# Patient Record
Sex: MALE | Race: AMERICAN INDIAN OR ALASKA NATIVE
[De-identification: names, ages, dates, MRNs, and addresses within clinical notes are randomized per-mention and may not be internally consistent; named-entity substitution may affect disease eponyms.]

---

## 2018-09-15 ENCOUNTER — HOSPITAL ENCOUNTER (EMERGENCY)
Dept: HOSPITAL 94 - ER | Age: 43
LOS: 1 days | Discharge: HOME | End: 2018-09-16
Payer: MEDICAID

## 2018-09-15 ENCOUNTER — HOSPITAL ENCOUNTER (EMERGENCY)
Dept: HOSPITAL 94 - ER | Age: 43
Discharge: HOME | End: 2018-09-15
Payer: MEDICAID

## 2018-09-15 VITALS — SYSTOLIC BLOOD PRESSURE: 193 MMHG | DIASTOLIC BLOOD PRESSURE: 117 MMHG

## 2018-09-15 VITALS — HEIGHT: 73 IN | BODY MASS INDEX: 33.2 KG/M2 | WEIGHT: 250.53 LBS

## 2018-09-15 VITALS — WEIGHT: 242.51 LBS | HEIGHT: 73 IN | BODY MASS INDEX: 32.14 KG/M2

## 2018-09-15 DIAGNOSIS — I10: ICD-10-CM

## 2018-09-15 DIAGNOSIS — F12.90: ICD-10-CM

## 2018-09-15 DIAGNOSIS — Z79.899: ICD-10-CM

## 2018-09-15 DIAGNOSIS — F15.90: ICD-10-CM

## 2018-09-15 DIAGNOSIS — Z88.6: ICD-10-CM

## 2018-09-15 DIAGNOSIS — M10.9: ICD-10-CM

## 2018-09-15 DIAGNOSIS — F19.90: ICD-10-CM

## 2018-09-15 DIAGNOSIS — M10.9: Primary | ICD-10-CM

## 2018-09-15 DIAGNOSIS — R00.2: Primary | ICD-10-CM

## 2018-09-15 DIAGNOSIS — R50.9: ICD-10-CM

## 2018-09-15 LAB
ALBUMIN SERPL BCP-MCNC: 3.1 G/DL (ref 3.4–5)
ALBUMIN/GLOB SERPL: 0.8 {RATIO} (ref 1.1–1.5)
ALP SERPL-CCNC: 97 IU/L (ref 46–116)
ALT SERPL W P-5'-P-CCNC: 113 U/L (ref 12–78)
ANION GAP SERPL CALCULATED.3IONS-SCNC: 8 MMOL/L (ref 8–16)
APTT PPP: 27 SECONDS (ref 22–32)
AST SERPL W P-5'-P-CCNC: 61 U/L (ref 10–37)
BASOPHILS # BLD AUTO: 0 X10'3 (ref 0–0.2)
BASOPHILS NFR BLD AUTO: 0.3 % (ref 0–1)
BILIRUB SERPL-MCNC: 0.8 MG/DL (ref 0.1–1)
BUN SERPL-MCNC: 14 MG/DL (ref 7–18)
BUN/CREAT SERPL: 11.2 (ref 5.4–32)
CALCIUM SERPL-MCNC: 8.4 MG/DL (ref 8.5–10.1)
CHLORIDE SERPL-SCNC: 99 MMOL/L (ref 99–107)
CO2 SERPL-SCNC: 27.8 MMOL/L (ref 24–32)
CREAT SERPL-MCNC: 1.25 MG/DL (ref 0.6–1.1)
EOSINOPHIL # BLD AUTO: 0.2 X10'3 (ref 0–0.9)
EOSINOPHIL NFR BLD AUTO: 1.8 % (ref 0–6)
ERYTHROCYTE [DISTWIDTH] IN BLOOD BY AUTOMATED COUNT: 13.8 % (ref 11.5–14.5)
GFR SERPL CREATININE-BSD FRML MDRD: 63 ML/MIN
GLUCOSE SERPL-MCNC: 125 MG/DL (ref 70–104)
HCT VFR BLD AUTO: 39.8 % (ref 42–52)
HGB BLD-MCNC: 13.8 G/DL (ref 14–17.9)
INR PPP: 0.9 INR
LYMPHOCYTES # BLD AUTO: 0.9 X10'3 (ref 1.1–4.8)
LYMPHOCYTES NFR BLD AUTO: 8.2 % (ref 21–51)
MAGNESIUM SERPL-MCNC: 2.2 MG/DL (ref 1.5–2.4)
MCH RBC QN AUTO: 30.8 PG (ref 27–31)
MCHC RBC AUTO-ENTMCNC: 34.7 % (ref 33–36.5)
MCV RBC AUTO: 88.8 FL (ref 78–98)
MONOCYTES # BLD AUTO: 0.9 X10'3 (ref 0–0.9)
MONOCYTES NFR BLD AUTO: 8.3 % (ref 2–12)
NEUTROPHILS # BLD AUTO: 8.5 X10'3 (ref 1.8–7.7)
NEUTROPHILS NFR BLD AUTO: 81.4 % (ref 42–75)
PLATELET # BLD AUTO: 189 X10'3 (ref 140–440)
PMV BLD AUTO: 7.9 FL (ref 7.4–10.4)
POTASSIUM SERPL-SCNC: 3.4 MMOL/L (ref 3.5–5.1)
PROT SERPL-MCNC: 7.2 G/DL (ref 6.4–8.2)
PROTHROMBIN TIME: 9.5 SECONDS (ref 9–12)
RBC # BLD AUTO: 4.48 X10'6 (ref 4.7–6.1)
SODIUM SERPL-SCNC: 135 MMOL/L (ref 135–145)
TROPONIN I SERPL-MCNC: < 0.04 NG/ML (ref 0–0.05)
WBC # BLD AUTO: 10.4 X10'3 (ref 4.5–11)

## 2018-09-15 PROCEDURE — 96375 TX/PRO/DX INJ NEW DRUG ADDON: CPT

## 2018-09-15 PROCEDURE — 96365 THER/PROPH/DIAG IV INF INIT: CPT

## 2018-09-15 PROCEDURE — 87040 BLOOD CULTURE FOR BACTERIA: CPT

## 2018-09-15 PROCEDURE — 85025 COMPLETE CBC W/AUTO DIFF WBC: CPT

## 2018-09-15 PROCEDURE — 83605 ASSAY OF LACTIC ACID: CPT

## 2018-09-15 PROCEDURE — 96376 TX/PRO/DX INJ SAME DRUG ADON: CPT

## 2018-09-15 PROCEDURE — 93005 ELECTROCARDIOGRAM TRACING: CPT

## 2018-09-15 PROCEDURE — 84145 PROCALCITONIN (PCT): CPT

## 2018-09-15 PROCEDURE — 71045 X-RAY EXAM CHEST 1 VIEW: CPT

## 2018-09-15 PROCEDURE — 99283 EMERGENCY DEPT VISIT LOW MDM: CPT

## 2018-09-15 PROCEDURE — 36415 COLL VENOUS BLD VENIPUNCTURE: CPT

## 2018-09-15 PROCEDURE — 99285 EMERGENCY DEPT VISIT HI MDM: CPT

## 2018-09-15 PROCEDURE — 85730 THROMBOPLASTIN TIME PARTIAL: CPT

## 2018-09-15 PROCEDURE — 84484 ASSAY OF TROPONIN QUANT: CPT

## 2018-09-15 PROCEDURE — 83735 ASSAY OF MAGNESIUM: CPT

## 2018-09-15 PROCEDURE — 80053 COMPREHEN METABOLIC PANEL: CPT

## 2018-09-15 PROCEDURE — 96361 HYDRATE IV INFUSION ADD-ON: CPT

## 2018-09-15 PROCEDURE — 85610 PROTHROMBIN TIME: CPT

## 2018-09-15 RX ADMIN — MAGNESIUM SULFATE HEPTAHYDRATE SCH MLS/HR: 1 INJECTION, SOLUTION INTRAVENOUS at 23:30

## 2018-09-16 VITALS — DIASTOLIC BLOOD PRESSURE: 58 MMHG | SYSTOLIC BLOOD PRESSURE: 141 MMHG

## 2018-09-16 LAB
EOSINOPHIL NFR BLD MANUAL: 1 % (ref 0–6)
LYMPHOCYTES NFR BLD MANUAL: 9 % (ref 21–51)
MONOCYTES NFR BLD MANUAL: 8 % (ref 2–12)
NEUTS SEG NFR BLD MANUAL: 82 % (ref 42–75)
PLATELET BLD QL SMEAR: NORMAL
RBC MORPH BLD: NORMAL
TOTAL CELLS COUNTED FLD: 100

## 2018-09-16 RX ADMIN — MAGNESIUM SULFATE HEPTAHYDRATE SCH MLS/HR: 1 INJECTION, SOLUTION INTRAVENOUS at 00:39

## 2018-11-12 ENCOUNTER — HOSPITAL ENCOUNTER (EMERGENCY)
Dept: HOSPITAL 94 - ER | Age: 43
Discharge: HOME | End: 2018-11-12
Payer: COMMERCIAL

## 2018-11-12 VITALS — HEIGHT: 73 IN | WEIGHT: 255.54 LBS | BODY MASS INDEX: 33.87 KG/M2

## 2018-11-12 VITALS — DIASTOLIC BLOOD PRESSURE: 95 MMHG | SYSTOLIC BLOOD PRESSURE: 158 MMHG

## 2018-11-12 DIAGNOSIS — F12.90: ICD-10-CM

## 2018-11-12 DIAGNOSIS — Z79.2: ICD-10-CM

## 2018-11-12 DIAGNOSIS — Z79.899: ICD-10-CM

## 2018-11-12 DIAGNOSIS — I10: ICD-10-CM

## 2018-11-12 DIAGNOSIS — M10.9: ICD-10-CM

## 2018-11-12 DIAGNOSIS — J40: Primary | ICD-10-CM

## 2018-11-12 DIAGNOSIS — Z88.8: ICD-10-CM

## 2018-11-12 DIAGNOSIS — F19.90: ICD-10-CM

## 2018-11-12 DIAGNOSIS — F15.90: ICD-10-CM

## 2018-11-12 LAB
ALBUMIN SERPL BCP-MCNC: 3.3 G/DL (ref 3.4–5)
ALBUMIN/GLOB SERPL: 0.7 {RATIO} (ref 1.1–1.5)
ALP SERPL-CCNC: 115 IU/L (ref 46–116)
ALT SERPL W P-5'-P-CCNC: 94 U/L (ref 12–78)
ANION GAP SERPL CALCULATED.3IONS-SCNC: 9 MMOL/L (ref 8–16)
APTT PPP: 29 SECONDS (ref 22–32)
AST SERPL W P-5'-P-CCNC: 56 U/L (ref 10–37)
BASOPHILS # BLD AUTO: 0.1 X10'3 (ref 0–0.2)
BASOPHILS NFR BLD AUTO: 1 % (ref 0–1)
BILIRUB SERPL-MCNC: 0.8 MG/DL (ref 0.1–1)
BUN SERPL-MCNC: 10 MG/DL (ref 7–18)
BUN/CREAT SERPL: 9.5 (ref 5.4–32)
CALCIUM SERPL-MCNC: 9.5 MG/DL (ref 8.5–10.1)
CHLORIDE SERPL-SCNC: 98 MMOL/L (ref 99–107)
CO2 SERPL-SCNC: 28.8 MMOL/L (ref 24–32)
CREAT SERPL-MCNC: 1.05 MG/DL (ref 0.6–1.1)
EOSINOPHIL # BLD AUTO: 0.1 X10'3 (ref 0–0.9)
EOSINOPHIL NFR BLD AUTO: 1.3 % (ref 0–6)
ERYTHROCYTE [DISTWIDTH] IN BLOOD BY AUTOMATED COUNT: 14.1 % (ref 11.5–14.5)
GFR SERPL CREATININE-BSD FRML MDRD: 77 ML/MIN
GLUCOSE SERPL-MCNC: 112 MG/DL (ref 70–104)
HCT VFR BLD AUTO: 44.1 % (ref 42–52)
HGB BLD-MCNC: 14.9 G/DL (ref 14–17.9)
INR PPP: 0.9 INR
LYMPHOCYTES # BLD AUTO: 1 X10'3 (ref 1.1–4.8)
LYMPHOCYTES NFR BLD AUTO: 9.4 % (ref 21–51)
MAGNESIUM SERPL-MCNC: 2.1 MG/DL (ref 1.5–2.4)
MCH RBC QN AUTO: 29.7 PG (ref 27–31)
MCHC RBC AUTO-ENTMCNC: 33.8 % (ref 33–36.5)
MCV RBC AUTO: 87.8 FL (ref 78–98)
MONOCYTES # BLD AUTO: 0.8 X10'3 (ref 0–0.9)
MONOCYTES NFR BLD AUTO: 7.1 % (ref 2–12)
NEUTROPHILS # BLD AUTO: 8.7 X10'3 (ref 1.8–7.7)
NEUTROPHILS NFR BLD AUTO: 81.2 % (ref 42–75)
PLATELET # BLD AUTO: 185 X10'3 (ref 140–440)
PMV BLD AUTO: 8 FL (ref 7.4–10.4)
POTASSIUM SERPL-SCNC: 4.5 MMOL/L (ref 3.5–5.1)
PROT SERPL-MCNC: 8.3 G/DL (ref 6.4–8.2)
PROTHROMBIN TIME: 9.4 SECONDS (ref 9–12)
RBC # BLD AUTO: 5.02 X10'6 (ref 4.7–6.1)
SODIUM SERPL-SCNC: 136 MMOL/L (ref 135–145)
WBC # BLD AUTO: 10.7 X10'3 (ref 4.5–11)

## 2018-11-12 PROCEDURE — 85730 THROMBOPLASTIN TIME PARTIAL: CPT

## 2018-11-12 PROCEDURE — 83605 ASSAY OF LACTIC ACID: CPT

## 2018-11-12 PROCEDURE — 80053 COMPREHEN METABOLIC PANEL: CPT

## 2018-11-12 PROCEDURE — 93005 ELECTROCARDIOGRAM TRACING: CPT

## 2018-11-12 PROCEDURE — 71045 X-RAY EXAM CHEST 1 VIEW: CPT

## 2018-11-12 PROCEDURE — 85025 COMPLETE CBC W/AUTO DIFF WBC: CPT

## 2018-11-12 PROCEDURE — 84145 PROCALCITONIN (PCT): CPT

## 2018-11-12 PROCEDURE — 36415 COLL VENOUS BLD VENIPUNCTURE: CPT

## 2018-11-12 PROCEDURE — 99285 EMERGENCY DEPT VISIT HI MDM: CPT

## 2018-11-12 PROCEDURE — 87040 BLOOD CULTURE FOR BACTERIA: CPT

## 2018-11-12 PROCEDURE — 83880 ASSAY OF NATRIURETIC PEPTIDE: CPT

## 2018-11-12 PROCEDURE — 83735 ASSAY OF MAGNESIUM: CPT

## 2018-11-12 PROCEDURE — 85610 PROTHROMBIN TIME: CPT

## 2019-09-25 ENCOUNTER — HOSPITAL ENCOUNTER (EMERGENCY)
Dept: HOSPITAL 94 - ER | Age: 44
Discharge: HOME | End: 2019-09-25
Payer: MEDICAID

## 2019-09-25 VITALS — WEIGHT: 295.2 LBS | HEIGHT: 73 IN | BODY MASS INDEX: 39.12 KG/M2

## 2019-09-25 VITALS — DIASTOLIC BLOOD PRESSURE: 91 MMHG | SYSTOLIC BLOOD PRESSURE: 138 MMHG

## 2019-09-25 DIAGNOSIS — L03.012: Primary | ICD-10-CM

## 2019-09-25 DIAGNOSIS — F15.90: ICD-10-CM

## 2019-09-25 DIAGNOSIS — Z88.6: ICD-10-CM

## 2019-09-25 DIAGNOSIS — Z79.899: ICD-10-CM

## 2019-09-25 DIAGNOSIS — I10: ICD-10-CM

## 2019-09-25 DIAGNOSIS — F12.90: ICD-10-CM

## 2019-09-25 LAB
ALBUMIN SERPL BCP-MCNC: 3.4 G/DL (ref 3.4–5)
ALBUMIN/GLOB SERPL: 0.8 {RATIO} (ref 1.1–1.5)
ALP SERPL-CCNC: 111 IU/L (ref 46–116)
ALT SERPL W P-5'-P-CCNC: 94 U/L (ref 12–78)
ANION GAP SERPL CALCULATED.3IONS-SCNC: 11 MMOL/L (ref 8–16)
AST SERPL W P-5'-P-CCNC: 52 U/L (ref 10–37)
BASOPHILS # BLD AUTO: 0.1 X10'3 (ref 0–0.2)
BASOPHILS NFR BLD AUTO: 0.6 % (ref 0–1)
BILIRUB SERPL-MCNC: 0.6 MG/DL (ref 0.1–1)
BUN SERPL-MCNC: 9 MG/DL (ref 7–18)
BUN/CREAT SERPL: 8.9 (ref 5.4–32)
CALCIUM SERPL-MCNC: 8.6 MG/DL (ref 8.5–10.1)
CHLORIDE SERPL-SCNC: 102 MMOL/L (ref 99–107)
CO2 SERPL-SCNC: 27.2 MMOL/L (ref 24–32)
CREAT SERPL-MCNC: 1.01 MG/DL (ref 0.6–1.1)
CRP SERPL HS-MCNC: 2.55 MG/DL (ref 0–0.5)
EOSINOPHIL # BLD AUTO: 0.2 X10'3 (ref 0–0.9)
EOSINOPHIL NFR BLD AUTO: 1.7 % (ref 0–6)
ERYTHROCYTE [DISTWIDTH] IN BLOOD BY AUTOMATED COUNT: 14.1 % (ref 11.5–14.5)
GFR SERPL CREATININE-BSD FRML MDRD: 80 ML/MIN
GLUCOSE SERPL-MCNC: 104 MG/DL (ref 70–104)
HCT VFR BLD AUTO: 39.7 % (ref 42–52)
HGB BLD-MCNC: 13.7 G/DL (ref 14–17.9)
LYMPHOCYTES # BLD AUTO: 1.2 X10'3 (ref 1.1–4.8)
LYMPHOCYTES NFR BLD AUTO: 10.6 % (ref 21–51)
MCH RBC QN AUTO: 30.4 PG (ref 27–31)
MCHC RBC AUTO-ENTMCNC: 34.5 G/DL (ref 33–36.5)
MCV RBC AUTO: 88.2 FL (ref 78–98)
MONOCYTES # BLD AUTO: 0.9 X10'3 (ref 0–0.9)
MONOCYTES NFR BLD AUTO: 8 % (ref 2–12)
NEUTROPHILS # BLD AUTO: 9 X10'3 (ref 1.8–7.7)
NEUTROPHILS NFR BLD AUTO: 79.1 % (ref 42–75)
PLATELET # BLD AUTO: 209 X10'3 (ref 140–440)
PMV BLD AUTO: 7.4 FL (ref 7.4–10.4)
POTASSIUM SERPL-SCNC: 3.2 MMOL/L (ref 3.5–5.1)
PROT SERPL-MCNC: 7.7 G/DL (ref 6.4–8.2)
RBC # BLD AUTO: 4.5 X10'6 (ref 4.7–6.1)
SODIUM SERPL-SCNC: 140 MMOL/L (ref 135–145)
WBC # BLD AUTO: 11.4 X10'3 (ref 4.5–11)

## 2019-09-25 PROCEDURE — 99284 EMERGENCY DEPT VISIT MOD MDM: CPT

## 2019-09-25 PROCEDURE — 86140 C-REACTIVE PROTEIN: CPT

## 2019-09-25 PROCEDURE — 96372 THER/PROPH/DIAG INJ SC/IM: CPT

## 2019-09-25 PROCEDURE — 26010 DRAINAGE OF FINGER ABSCESS: CPT

## 2019-09-25 PROCEDURE — 85025 COMPLETE CBC W/AUTO DIFF WBC: CPT

## 2019-09-25 PROCEDURE — 85651 RBC SED RATE NONAUTOMATED: CPT

## 2019-09-25 PROCEDURE — 80053 COMPREHEN METABOLIC PANEL: CPT

## 2019-09-25 PROCEDURE — 84145 PROCALCITONIN (PCT): CPT

## 2019-09-25 PROCEDURE — 73140 X-RAY EXAM OF FINGER(S): CPT

## 2019-09-25 PROCEDURE — 36415 COLL VENOUS BLD VENIPUNCTURE: CPT

## 2019-09-27 ENCOUNTER — HOSPITAL ENCOUNTER (INPATIENT)
Dept: HOSPITAL 94 - ER | Age: 44
LOS: 2 days | Discharge: HOME | DRG: 383 | End: 2019-09-29
Attending: INTERNAL MEDICINE | Admitting: INTERNAL MEDICINE
Payer: MEDICAID

## 2019-09-27 VITALS — WEIGHT: 279.99 LBS | BODY MASS INDEX: 37.11 KG/M2 | HEIGHT: 73 IN

## 2019-09-27 VITALS — DIASTOLIC BLOOD PRESSURE: 96 MMHG | SYSTOLIC BLOOD PRESSURE: 163 MMHG

## 2019-09-27 VITALS — SYSTOLIC BLOOD PRESSURE: 173 MMHG | DIASTOLIC BLOOD PRESSURE: 95 MMHG

## 2019-09-27 DIAGNOSIS — F12.90: ICD-10-CM

## 2019-09-27 DIAGNOSIS — F15.90: ICD-10-CM

## 2019-09-27 DIAGNOSIS — M10.9: ICD-10-CM

## 2019-09-27 DIAGNOSIS — I10: ICD-10-CM

## 2019-09-27 DIAGNOSIS — L03.012: Primary | ICD-10-CM

## 2019-09-27 DIAGNOSIS — Z88.5: ICD-10-CM

## 2019-09-27 DIAGNOSIS — F17.200: ICD-10-CM

## 2019-09-27 DIAGNOSIS — Z28.21: ICD-10-CM

## 2019-09-27 DIAGNOSIS — C92.10: ICD-10-CM

## 2019-09-27 DIAGNOSIS — B18.2: ICD-10-CM

## 2019-09-27 DIAGNOSIS — Z79.899: ICD-10-CM

## 2019-09-27 DIAGNOSIS — Z85.72: ICD-10-CM

## 2019-09-27 LAB
ALBUMIN SERPL BCP-MCNC: 3.2 G/DL (ref 3.4–5)
ALBUMIN/GLOB SERPL: 0.7 {RATIO} (ref 1.1–1.5)
ALP SERPL-CCNC: 117 IU/L (ref 46–116)
ALT SERPL W P-5'-P-CCNC: 93 U/L (ref 12–78)
ANION GAP SERPL CALCULATED.3IONS-SCNC: 11 MMOL/L (ref 8–16)
AST SERPL W P-5'-P-CCNC: 60 U/L (ref 10–37)
BASOPHILS # BLD AUTO: 0.1 X10'3 (ref 0–0.2)
BASOPHILS NFR BLD AUTO: 1 % (ref 0–1)
BILIRUB SERPL-MCNC: 0.3 MG/DL (ref 0.1–1)
BUN SERPL-MCNC: 11 MG/DL (ref 7–18)
BUN/CREAT SERPL: 10.1 (ref 5.4–32)
CALCIUM SERPL-MCNC: 8.4 MG/DL (ref 8.5–10.1)
CHLORIDE SERPL-SCNC: 104 MMOL/L (ref 99–107)
CO2 SERPL-SCNC: 26.3 MMOL/L (ref 24–32)
CREAT SERPL-MCNC: 1.09 MG/DL (ref 0.6–1.1)
EOSINOPHIL # BLD AUTO: 0.2 X10'3 (ref 0–0.9)
EOSINOPHIL NFR BLD AUTO: 2.9 % (ref 0–6)
EOSINOPHIL NFR BLD MANUAL: 1 % (ref 0–6)
ERYTHROCYTE [DISTWIDTH] IN BLOOD BY AUTOMATED COUNT: 14 % (ref 11.5–14.5)
GFR SERPL CREATININE-BSD FRML MDRD: 73 ML/MIN
GLUCOSE SERPL-MCNC: 126 MG/DL (ref 70–104)
HCT VFR BLD AUTO: 41.1 % (ref 42–52)
HGB BLD-MCNC: 14.2 G/DL (ref 14–17.9)
LYMPHOCYTES # BLD AUTO: 1.3 X10'3 (ref 1.1–4.8)
LYMPHOCYTES NFR BLD AUTO: 16.8 % (ref 21–51)
LYMPHOCYTES NFR BLD MANUAL: 18 % (ref 21–51)
MCH RBC QN AUTO: 31.4 PG (ref 27–31)
MCHC RBC AUTO-ENTMCNC: 34.6 G/DL (ref 33–36.5)
MCV RBC AUTO: 90.7 FL (ref 78–98)
METAMYELOCYTES NFR BLD MANUAL: 2 % (ref 0–0)
MONOCYTES # BLD AUTO: 0.6 X10'3 (ref 0–0.9)
MONOCYTES NFR BLD AUTO: 7.8 % (ref 2–12)
MONOCYTES NFR BLD MANUAL: 8 % (ref 2–12)
NEUTROPHILS # BLD AUTO: 5.6 X10'3 (ref 1.8–7.7)
NEUTROPHILS NFR BLD AUTO: 71.5 % (ref 42–75)
NEUTS BAND # BLD MANUAL: 2 % (ref 0–10)
NEUTS SEG NFR BLD MANUAL: 69 % (ref 42–75)
PLATELET # BLD AUTO: 199 X10'3 (ref 140–440)
PLATELET BLD QL SMEAR: NORMAL
PMV BLD AUTO: 7.6 FL (ref 7.4–10.4)
POTASSIUM SERPL-SCNC: 3.5 MMOL/L (ref 3.5–5.1)
PROT SERPL-MCNC: 7.5 G/DL (ref 6.4–8.2)
RBC # BLD AUTO: 4.53 X10'6 (ref 4.7–6.1)
RBC MORPH BLD: NORMAL
SODIUM SERPL-SCNC: 141 MMOL/L (ref 135–145)
TOTAL CELLS COUNTED FLD: 100
WBC # BLD AUTO: 7.8 X10'3 (ref 4.5–11)

## 2019-09-27 PROCEDURE — 36415 COLL VENOUS BLD VENIPUNCTURE: CPT

## 2019-09-27 PROCEDURE — 80053 COMPREHEN METABOLIC PANEL: CPT

## 2019-09-27 PROCEDURE — 83735 ASSAY OF MAGNESIUM: CPT

## 2019-09-27 PROCEDURE — 87077 CULTURE AEROBIC IDENTIFY: CPT

## 2019-09-27 PROCEDURE — 80202 ASSAY OF VANCOMYCIN: CPT

## 2019-09-27 PROCEDURE — 80048 BASIC METABOLIC PNL TOTAL CA: CPT

## 2019-09-27 PROCEDURE — 87186 SC STD MICRODIL/AGAR DIL: CPT

## 2019-09-27 PROCEDURE — 99285 EMERGENCY DEPT VISIT HI MDM: CPT

## 2019-09-27 PROCEDURE — 73140 X-RAY EXAM OF FINGER(S): CPT

## 2019-09-27 PROCEDURE — 87081 CULTURE SCREEN ONLY: CPT

## 2019-09-27 PROCEDURE — 96375 TX/PRO/DX INJ NEW DRUG ADDON: CPT

## 2019-09-27 PROCEDURE — 96365 THER/PROPH/DIAG IV INF INIT: CPT

## 2019-09-27 PROCEDURE — 87070 CULTURE OTHR SPECIMN AEROBIC: CPT

## 2019-09-27 PROCEDURE — 85025 COMPLETE CBC W/AUTO DIFF WBC: CPT

## 2019-09-27 RX ADMIN — TAZOBACTAM SODIUM AND PIPERACILLIN SODIUM SCH MLS/HR: 375; 3 INJECTION, SOLUTION INTRAVENOUS at 23:58

## 2019-09-27 RX ADMIN — HYDROCODONE BITARTRATE AND ACETAMINOPHEN PRN TAB: 5; 325 TABLET ORAL at 20:22

## 2019-09-27 RX ADMIN — DOCUSATE SODIUM SCH MG: 100 CAPSULE, LIQUID FILLED ORAL at 20:22

## 2019-09-27 RX ADMIN — HYDROCODONE BITARTRATE AND ACETAMINOPHEN PRN TAB: 5; 325 TABLET ORAL at 14:52

## 2019-09-27 NOTE — NUR
pt smokes marijuana daily 

pt lost father in law 2 months ago and states "he was close to him"

-------------------------------------------------------------------------------

Addendum: 09/27/19 at 2157 by Cheri Martines RN

-------------------------------------------------------------------------------

Amended: Links added.

## 2019-09-28 VITALS — DIASTOLIC BLOOD PRESSURE: 96 MMHG | SYSTOLIC BLOOD PRESSURE: 150 MMHG

## 2019-09-28 VITALS — DIASTOLIC BLOOD PRESSURE: 100 MMHG | SYSTOLIC BLOOD PRESSURE: 167 MMHG

## 2019-09-28 LAB
ALBUMIN SERPL BCP-MCNC: 3 G/DL (ref 3.4–5)
ANION GAP SERPL CALCULATED.3IONS-SCNC: 10 MMOL/L (ref 8–16)
BASOPHILS # BLD AUTO: 0 X10'3 (ref 0–0.2)
BASOPHILS NFR BLD AUTO: 0.4 % (ref 0–1)
BUN SERPL-MCNC: 11 MG/DL (ref 7–18)
BUN/CREAT SERPL: 10 (ref 5.4–32)
CALCIUM SERPL-MCNC: 8.4 MG/DL (ref 8.5–10.1)
CHLORIDE SERPL-SCNC: 106 MMOL/L (ref 99–107)
CO2 SERPL-SCNC: 25.8 MMOL/L (ref 24–32)
CREAT SERPL-MCNC: 1.1 MG/DL (ref 0.6–1.1)
EOSINOPHIL # BLD AUTO: 0.2 X10'3 (ref 0–0.9)
EOSINOPHIL NFR BLD AUTO: 3.2 % (ref 0–6)
EOSINOPHIL NFR BLD MANUAL: 2 % (ref 0–6)
ERYTHROCYTE [DISTWIDTH] IN BLOOD BY AUTOMATED COUNT: 13.6 % (ref 11.5–14.5)
GFR SERPL CREATININE-BSD FRML MDRD: 72 ML/MIN
GLUCOSE SERPL-MCNC: 126 MG/DL (ref 70–104)
HCT VFR BLD AUTO: 39.8 % (ref 42–52)
HGB BLD-MCNC: 13.9 G/DL (ref 14–17.9)
LYMPHOCYTES # BLD AUTO: 1.6 X10'3 (ref 1.1–4.8)
LYMPHOCYTES NFR BLD AUTO: 21.4 % (ref 21–51)
LYMPHOCYTES NFR BLD MANUAL: 23 % (ref 21–51)
MAGNESIUM SERPL-MCNC: 2 MG/DL (ref 1.5–2.4)
MCH RBC QN AUTO: 31.1 PG (ref 27–31)
MCHC RBC AUTO-ENTMCNC: 35 G/DL (ref 33–36.5)
MCV RBC AUTO: 88.9 FL (ref 78–98)
METAMYELOCYTES NFR BLD MANUAL: 3 % (ref 0–0)
MONOCYTES # BLD AUTO: 0.6 X10'3 (ref 0–0.9)
MONOCYTES NFR BLD AUTO: 7.7 % (ref 2–12)
MONOCYTES NFR BLD MANUAL: 7 % (ref 2–12)
NEUTROPHILS # BLD AUTO: 4.9 X10'3 (ref 1.8–7.7)
NEUTROPHILS NFR BLD AUTO: 67.3 % (ref 42–75)
NEUTS SEG NFR BLD MANUAL: 65 % (ref 42–75)
NRBC BLD MANUAL-RTO: 1 /100WBC (ref 0–0)
PLATELET # BLD AUTO: 202 X10'3 (ref 140–440)
PLATELET BLD QL SMEAR: NORMAL
PMV BLD AUTO: 8 FL (ref 7.4–10.4)
POLYCHROMASIA BLD QL SMEAR: (no result)
POTASSIUM SERPL-SCNC: 3.4 MMOL/L (ref 3.5–5.1)
RBC # BLD AUTO: 4.48 X10'6 (ref 4.7–6.1)
RBC MORPH BLD: (no result)
SODIUM SERPL-SCNC: 142 MMOL/L (ref 135–145)
TOTAL CELLS COUNTED FLD: 100
WBC # BLD AUTO: 7.2 X10'3 (ref 4.5–11)

## 2019-09-28 RX ADMIN — HYDROCODONE BITARTRATE AND ACETAMINOPHEN PRN TAB: 5; 325 TABLET ORAL at 06:25

## 2019-09-28 RX ADMIN — HYDROCODONE BITARTRATE AND ACETAMINOPHEN PRN TAB: 5; 325 TABLET ORAL at 17:20

## 2019-09-28 RX ADMIN — POTASSIUM CHLORIDE PRN MEQ: 1500 TABLET, EXTENDED RELEASE ORAL at 13:19

## 2019-09-28 RX ADMIN — TAZOBACTAM SODIUM AND PIPERACILLIN SODIUM SCH MLS/HR: 375; 3 INJECTION, SOLUTION INTRAVENOUS at 08:57

## 2019-09-28 RX ADMIN — HYDROCODONE BITARTRATE AND ACETAMINOPHEN PRN TAB: 5; 325 TABLET ORAL at 12:25

## 2019-09-28 RX ADMIN — POTASSIUM CHLORIDE PRN MEQ: 1500 TABLET, EXTENDED RELEASE ORAL at 08:57

## 2019-09-28 RX ADMIN — DOCUSATE SODIUM SCH MG: 100 CAPSULE, LIQUID FILLED ORAL at 08:57

## 2019-09-28 RX ADMIN — POTASSIUM CHLORIDE PRN MEQ: 1500 TABLET, EXTENDED RELEASE ORAL at 03:43

## 2019-09-28 RX ADMIN — TAZOBACTAM SODIUM AND PIPERACILLIN SODIUM SCH MLS/HR: 375; 3 INJECTION, SOLUTION INTRAVENOUS at 15:36

## 2019-09-28 RX ADMIN — DOCUSATE SODIUM SCH MG: 100 CAPSULE, LIQUID FILLED ORAL at 20:12

## 2019-09-28 RX ADMIN — Medication SCH MMU: at 20:12

## 2019-09-28 NOTE — NUR
In agreement with and have reviewed all charting and med pass completed by Cheri MTZ for 
the shift time frame.

## 2019-09-28 NOTE — NUR
Problems reprioritized. Patient report given, questions answered & plan of care reviewed 
with Rudolph MCKINNEY

## 2019-09-28 NOTE — NUR
Patient in room [facility] 4022. I have received report from [facility]   and had the opportunity to 
ask questions and assume patient care.

## 2019-09-29 VITALS — DIASTOLIC BLOOD PRESSURE: 90 MMHG | SYSTOLIC BLOOD PRESSURE: 139 MMHG

## 2019-09-29 VITALS — SYSTOLIC BLOOD PRESSURE: 137 MMHG | DIASTOLIC BLOOD PRESSURE: 91 MMHG

## 2019-09-29 VITALS — SYSTOLIC BLOOD PRESSURE: 137 MMHG

## 2019-09-29 LAB
ALBUMIN SERPL BCP-MCNC: 2.8 G/DL (ref 3.4–5)
ANION GAP SERPL CALCULATED.3IONS-SCNC: 7 MMOL/L (ref 8–16)
BASOPHILS # BLD AUTO: 0.1 X10'3 (ref 0–0.2)
BASOPHILS NFR BLD AUTO: 1.3 % (ref 0–1)
BUN SERPL-MCNC: 13 MG/DL (ref 7–18)
BUN/CREAT SERPL: 11.7 (ref 5.4–32)
CALCIUM SERPL-MCNC: 8.5 MG/DL (ref 8.5–10.1)
CHLORIDE SERPL-SCNC: 109 MMOL/L (ref 99–107)
CO2 SERPL-SCNC: 28.4 MMOL/L (ref 24–32)
CREAT SERPL-MCNC: 1.11 MG/DL (ref 0.6–1.1)
EOSINOPHIL # BLD AUTO: 0.3 X10'3 (ref 0–0.9)
EOSINOPHIL NFR BLD AUTO: 2.8 % (ref 0–6)
ERYTHROCYTE [DISTWIDTH] IN BLOOD BY AUTOMATED COUNT: 13.9 % (ref 11.5–14.5)
GFR SERPL CREATININE-BSD FRML MDRD: 72 ML/MIN
GLUCOSE SERPL-MCNC: 103 MG/DL (ref 70–104)
HCT VFR BLD AUTO: 40.7 % (ref 42–52)
HGB BLD-MCNC: 14.1 G/DL (ref 14–17.9)
LYMPHOCYTES # BLD AUTO: 2.7 X10'3 (ref 1.1–4.8)
LYMPHOCYTES NFR BLD AUTO: 26.4 % (ref 21–51)
MAGNESIUM SERPL-MCNC: 2.2 MG/DL (ref 1.5–2.4)
MCH RBC QN AUTO: 31.6 PG (ref 27–31)
MCHC RBC AUTO-ENTMCNC: 34.8 G/DL (ref 33–36.5)
MCV RBC AUTO: 91 FL (ref 78–98)
MONOCYTES # BLD AUTO: 0.8 X10'3 (ref 0–0.9)
MONOCYTES NFR BLD AUTO: 7.8 % (ref 2–12)
NEUTROPHILS # BLD AUTO: 6.2 X10'3 (ref 1.8–7.7)
NEUTROPHILS NFR BLD AUTO: 61.7 % (ref 42–75)
PLATELET # BLD AUTO: 176 X10'3 (ref 140–440)
PMV BLD AUTO: 7.7 FL (ref 7.4–10.4)
POTASSIUM SERPL-SCNC: 3.7 MMOL/L (ref 3.5–5.1)
RBC # BLD AUTO: 4.47 X10'6 (ref 4.7–6.1)
SODIUM SERPL-SCNC: 144 MMOL/L (ref 135–145)
VANCOMYCIN SERPL-MCNC: 19.2 UG/ML (ref 6–14)
WBC # BLD AUTO: 10.1 X10'3 (ref 4.5–11)

## 2019-09-29 RX ADMIN — DOCUSATE SODIUM SCH MG: 100 CAPSULE, LIQUID FILLED ORAL at 10:52

## 2019-09-29 RX ADMIN — HYDROCODONE BITARTRATE AND ACETAMINOPHEN PRN TAB: 5; 325 TABLET ORAL at 11:08

## 2019-09-29 RX ADMIN — TAZOBACTAM SODIUM AND PIPERACILLIN SODIUM SCH MLS/HR: 375; 3 INJECTION, SOLUTION INTRAVENOUS at 02:04

## 2019-09-29 RX ADMIN — TAZOBACTAM SODIUM AND PIPERACILLIN SODIUM SCH MLS/HR: 375; 3 INJECTION, SOLUTION INTRAVENOUS at 10:52

## 2019-09-29 RX ADMIN — Medication SCH MMU: at 10:52

## 2019-09-29 RX ADMIN — HYDROCODONE BITARTRATE AND ACETAMINOPHEN PRN TAB: 5; 325 TABLET ORAL at 04:22

## 2019-09-29 NOTE — NUR
Received discharge orders from Dr. Langston. Instructed pt and his wife on how to provide wound 
care to pts finger per Dr. Langston and to change dressing daily.  S/P Left finger I & D.  
Irrigated finger left hand with SNS. Applied Betadine to finger and allowed to dry. 
Antibiotic ointment applied to finger. Applied Xeroform Gauze to open incision to finger 
left hand. Covered with Mee wrap and applied tape. Gave pt a few supplies to take home to 
provide daily wound care. Wife present and instructed on wound care. Answered appropriate 
questions. IV dc'd RFA x2. No redness/swelling at insertion sites. Applied bandaid with 
folded 2x2 with slight pressure. Tolerated well. Called in RX for Clindamycin to [facility] 
on [facility] per pts request. RX for Norco written by  and copied. Placed copy 
in pts chart. Gave original to pt and instructed to take to pharmacy to fill.  Pt discharged 
via w/c to private vehicle.

## 2020-01-13 ENCOUNTER — HOSPITAL ENCOUNTER (EMERGENCY)
Dept: HOSPITAL 94 - ER | Age: 45
Discharge: HOME | End: 2020-01-13
Payer: MEDICAID

## 2020-01-13 VITALS — SYSTOLIC BLOOD PRESSURE: 184 MMHG | DIASTOLIC BLOOD PRESSURE: 124 MMHG

## 2020-01-13 VITALS — WEIGHT: 306.22 LBS | HEIGHT: 73 IN | BODY MASS INDEX: 40.58 KG/M2

## 2020-01-13 DIAGNOSIS — S06.0X0A: Primary | ICD-10-CM

## 2020-01-13 DIAGNOSIS — F17.200: ICD-10-CM

## 2020-01-13 DIAGNOSIS — F15.90: ICD-10-CM

## 2020-01-13 DIAGNOSIS — Z92.21: ICD-10-CM
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
## 2020-01-13 DIAGNOSIS — S60.051A: ICD-10-CM

## 2020-01-13 DIAGNOSIS — Z79.899: ICD-10-CM

## 2020-01-13 DIAGNOSIS — M10.9: ICD-10-CM

## 2020-01-13 DIAGNOSIS — Z88.6: ICD-10-CM

## 2020-01-13 DIAGNOSIS — I10: ICD-10-CM
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
## 2020-01-13 DIAGNOSIS — F12.90: ICD-10-CM
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
## 2020-01-13 PROCEDURE — 29130 APPL FINGER SPLINT STATIC: CPT

## 2020-01-13 PROCEDURE — 99284 EMERGENCY DEPT VISIT MOD MDM: CPT

## 2020-01-13 PROCEDURE — 73140 X-RAY EXAM OF FINGER(S): CPT

## 2020-12-30 ENCOUNTER — HOSPITAL ENCOUNTER (EMERGENCY)
Dept: HOSPITAL 94 - ER | Age: 45
Discharge: HOME | End: 2020-12-30
Payer: MEDICAID

## 2020-12-30 VITALS — BODY MASS INDEX: 38.52 KG/M2 | HEIGHT: 73 IN | WEIGHT: 290.61 LBS

## 2020-12-30 VITALS — SYSTOLIC BLOOD PRESSURE: 132 MMHG | DIASTOLIC BLOOD PRESSURE: 92 MMHG

## 2020-12-30 DIAGNOSIS — C95.90: ICD-10-CM

## 2020-12-30 DIAGNOSIS — J12.89: ICD-10-CM

## 2020-12-30 DIAGNOSIS — Z79.899: ICD-10-CM

## 2020-12-30 DIAGNOSIS — F12.90: ICD-10-CM

## 2020-12-30 DIAGNOSIS — Z86.19: ICD-10-CM

## 2020-12-30 DIAGNOSIS — I10: ICD-10-CM

## 2020-12-30 DIAGNOSIS — Z88.6: ICD-10-CM

## 2020-12-30 DIAGNOSIS — J10.1: ICD-10-CM

## 2020-12-30 DIAGNOSIS — F15.90: ICD-10-CM

## 2020-12-30 DIAGNOSIS — U07.1: Primary | ICD-10-CM

## 2020-12-30 DIAGNOSIS — M10.9: ICD-10-CM

## 2020-12-30 LAB
ALBUMIN SERPL BCP-MCNC: 3.8 G/DL (ref 3.4–5)
ALBUMIN/GLOB SERPL: 0.8 {RATIO} (ref 1.1–1.5)
ALP SERPL-CCNC: 99 IU/L (ref 46–116)
ALT SERPL W P-5'-P-CCNC: 135 U/L (ref 12–78)
ANION GAP SERPL CALCULATED.3IONS-SCNC: 9 MMOL/L (ref 8–16)
ANISOCYTOSIS BLD QL SMEAR: (no result)
APTT PPP: 28 SECONDS (ref 22–32)
AST SERPL W P-5'-P-CCNC: 92 U/L (ref 10–37)
BASOPHILS # BLD AUTO: 0.2 X10'3 (ref 0–0.2)
BASOPHILS NFR BLD AUTO: 0.8 % (ref 0–1)
BASOPHILS NFR BLD MANUAL: 1 % (ref 0–1)
BILIRUB SERPL-MCNC: 0.6 MG/DL (ref 0.1–1)
BUN SERPL-MCNC: 18 MG/DL (ref 7–18)
BUN/CREAT SERPL: 15.1 (ref 5.4–32)
CALCIUM SERPL-MCNC: 8.9 MG/DL (ref 8.5–10.1)
CHLORIDE SERPL-SCNC: 105 MMOL/L (ref 99–107)
CLARITY UR: CLEAR
CO2 SERPL-SCNC: 24.4 MMOL/L (ref 24–32)
COLOR UR: YELLOW
CREAT SERPL-MCNC: 1.19 MG/DL (ref 0.6–1.1)
CRP SERPL HS-MCNC: 0.63 MG/DL (ref 0–0.5)
D DIMER PPP FEU-MCNC: 0.7 MG/L FEU (ref 0–0.5)
EOSINOPHIL # BLD AUTO: 0.2 X10'3 (ref 0–0.9)
EOSINOPHIL NFR BLD AUTO: 0.7 % (ref 0–6)
ERYTHROCYTE [DISTWIDTH] IN BLOOD BY AUTOMATED COUNT: 18.1 % (ref 11.5–14.5)
FERRITIN SERPL-MCNC: 152 NG/ML (ref 26–388)
GFR SERPL CREATININE-BSD FRML MDRD: 66 ML/MIN
GLUCOSE SERPL-MCNC: 97 MG/DL (ref 70–104)
GLUCOSE UR STRIP-MCNC: NEGATIVE MG/DL
HCT VFR BLD AUTO: 42.8 % (ref 42–52)
HGB BLD-MCNC: 14.2 G/DL (ref 14–17.9)
HGB UR QL STRIP: NEGATIVE
KETONES UR STRIP-MCNC: NEGATIVE MG/DL
LDH SERPL-CCNC: 466 U/L (ref 85–227)
LEUKOCYTE ESTERASE UR QL STRIP: NEGATIVE
LYMPHOCYTES # BLD AUTO: 1.8 X10'3 (ref 1.1–4.8)
LYMPHOCYTES NFR BLD AUTO: 7.4 % (ref 21–51)
LYMPHOCYTES NFR BLD MANUAL: 4 % (ref 21–51)
MAGNESIUM SERPL-MCNC: 2.2 MG/DL (ref 1.5–2.4)
MCH RBC QN AUTO: 30.5 PG (ref 27–31)
MCHC RBC AUTO-ENTMCNC: 33.2 G/DL (ref 33–36.5)
MCV RBC AUTO: 91.8 FL (ref 78–98)
METAMYELOCYTES NFR BLD MANUAL: 5 % (ref 0–0)
MONOCYTES # BLD AUTO: 2.6 X10'3 (ref 0–0.9)
MONOCYTES NFR BLD AUTO: 10.8 % (ref 2–12)
MONOCYTES NFR BLD MANUAL: 8 % (ref 2–12)
MYELOCYTES NFR BLD MANUAL: 2 % (ref 0–0)
NEUTROPHILS # BLD AUTO: 19.2 X10'3 (ref 1.8–7.7)
NEUTROPHILS NFR BLD AUTO: 80.3 % (ref 42–75)
NEUTS BAND # BLD MANUAL: 16 % (ref 0–10)
NEUTS SEG NFR BLD MANUAL: 64 % (ref 42–75)
NITRITE UR QL STRIP: NEGATIVE
PH UR STRIP: 6 [PH] (ref 4.8–8)
PLATELET # BLD AUTO: 153 X10'3 (ref 140–440)
PLATELET BLD QL SMEAR: NORMAL
PMV BLD AUTO: 8 FL (ref 7.4–10.4)
POTASSIUM SERPL-SCNC: 4.3 MMOL/L (ref 3.5–5.1)
PROT SERPL-MCNC: 8.4 G/DL (ref 6.4–8.2)
PROT UR QL STRIP: NEGATIVE MG/DL
RBC # BLD AUTO: 4.66 X10'6 (ref 4.7–6.1)
RBC MORPH BLD: (no result)
SODIUM SERPL-SCNC: 138 MMOL/L (ref 135–145)
SP GR UR STRIP: 1.02 (ref 1–1.03)
TOTAL CELLS COUNTED FLD: 100
URN COLLECT METHOD CLASS: (no result)
UROBILINOGEN UR STRIP-MCNC: 0.2 E.U/DL (ref 0.2–1)
WBC # BLD AUTO: 23.9 X10'3 (ref 4.5–11)

## 2020-12-30 PROCEDURE — 87502 INFLUENZA DNA AMP PROBE: CPT

## 2020-12-30 PROCEDURE — 85384 FIBRINOGEN ACTIVITY: CPT

## 2020-12-30 PROCEDURE — 85007 BL SMEAR W/DIFF WBC COUNT: CPT

## 2020-12-30 PROCEDURE — 99285 EMERGENCY DEPT VISIT HI MDM: CPT

## 2020-12-30 PROCEDURE — 83735 ASSAY OF MAGNESIUM: CPT

## 2020-12-30 PROCEDURE — 93005 ELECTROCARDIOGRAM TRACING: CPT

## 2020-12-30 PROCEDURE — 84484 ASSAY OF TROPONIN QUANT: CPT

## 2020-12-30 PROCEDURE — 86140 C-REACTIVE PROTEIN: CPT

## 2020-12-30 PROCEDURE — 71045 X-RAY EXAM CHEST 1 VIEW: CPT

## 2020-12-30 PROCEDURE — 85379 FIBRIN DEGRADATION QUANT: CPT

## 2020-12-30 PROCEDURE — 80053 COMPREHEN METABOLIC PANEL: CPT

## 2020-12-30 PROCEDURE — 84145 PROCALCITONIN (PCT): CPT

## 2020-12-30 PROCEDURE — 36415 COLL VENOUS BLD VENIPUNCTURE: CPT

## 2020-12-30 PROCEDURE — 87503 INFLUENZA DNA AMP PROB ADDL: CPT

## 2020-12-30 PROCEDURE — 96375 TX/PRO/DX INJ NEW DRUG ADDON: CPT

## 2020-12-30 PROCEDURE — 71275 CT ANGIOGRAPHY CHEST: CPT

## 2020-12-30 PROCEDURE — 87635 SARS-COV-2 COVID-19 AMP PRB: CPT

## 2020-12-30 PROCEDURE — 85610 PROTHROMBIN TIME: CPT

## 2020-12-30 PROCEDURE — 96366 THER/PROPH/DIAG IV INF ADDON: CPT

## 2020-12-30 PROCEDURE — 83615 LACTATE (LD) (LDH) ENZYME: CPT

## 2020-12-30 PROCEDURE — 87040 BLOOD CULTURE FOR BACTERIA: CPT

## 2020-12-30 PROCEDURE — 85730 THROMBOPLASTIN TIME PARTIAL: CPT

## 2020-12-30 PROCEDURE — 96365 THER/PROPH/DIAG IV INF INIT: CPT

## 2020-12-30 PROCEDURE — 83880 ASSAY OF NATRIURETIC PEPTIDE: CPT

## 2020-12-30 PROCEDURE — 81003 URINALYSIS AUTO W/O SCOPE: CPT

## 2020-12-30 PROCEDURE — 85025 COMPLETE CBC W/AUTO DIFF WBC: CPT

## 2020-12-30 PROCEDURE — 83605 ASSAY OF LACTIC ACID: CPT

## 2020-12-30 PROCEDURE — 82728 ASSAY OF FERRITIN: CPT
